# Patient Record
(demographics unavailable — no encounter records)

---

## 2025-01-09 NOTE — REVIEW OF SYSTEMS
[Constipation: Grade 0] : Constipation: Grade 0 [Diarrhea: Grade 0] : Diarrhea: Grade 0 [Proctitis: Grade 0] : Proctitis: Grade 0 [Rectal Pain: Grade 0] : Rectal Pain: Grade 0 [Hematuria: Grade 0] : Hematuria: Grade 0 [Urinary Incontinence: Grade 0] : Urinary Incontinence: Grade 0  [Urinary Retention: Grade 0] : Urinary Retention: Grade 0 [Urinary Tract Pain: Grade 0] : Urinary Tract Pain: Grade 0 [Urinary Urgency: Grade 0] : Urinary Urgency: Grade 0 [Urinary Frequency: Grade 1 - Present] : Urinary Frequency: Grade 1 - Present

## 2025-01-13 NOTE — DISEASE MANAGEMENT
[1] : T1 [c] : c [0] : M0 [10-20] : 10 - 20 ng/mL [Biopsy with Fusion] : Patient had a biopsy with fusion on [8] : Fusion Biopsy Geeta Score: 8 [5] : 5 [] : Patient had a Prostate MRI [IIC] : IIC [Radiation Therapy] : Radiation Therapy [EBRT] : EBRT [HDR] : HDR [Treatment with androgen ablation] : Treatment with androgen ablation [BiopsyDate] : 07/22 [MeasuredProstateVolume] : 32 [TotalCores] : 13 [TotalPositiveCores] : 6 [MaxCoreInvolvement] : 100 [RadiationCompletedDate] : 11/2022 [EBRTDose] : 4500cGy [EBRTFractions] : 25 [HDRFractions] : seed implant 11/9/22 [ADTStartedDate] : 08/22 [ADTDuration] : 2 years

## 2025-01-13 NOTE — DISEASE MANAGEMENT
[1] : T1 [c] : c [0] : M0 [10-20] : 10 - 20 ng/mL [Biopsy with Fusion] : Patient had a biopsy with fusion on [8] : Fusion Biopsy Geeta Score: 8 [] : Patient had a Prostate MRI [5] : 5 [IIC] : IIC [Radiation Therapy] : Radiation Therapy [EBRT] : EBRT [HDR] : HDR [Treatment with androgen ablation] : Treatment with androgen ablation [BiopsyDate] : 07/22 [MeasuredProstateVolume] : 32 [TotalCores] : 13 [TotalPositiveCores] : 6 [MaxCoreInvolvement] : 100 [RadiationCompletedDate] : 11/2022 [EBRTDose] : 4500cGy [EBRTFractions] : 25 [HDRFractions] : seed implant 11/9/22 [ADTStartedDate] : 08/22 [ADTDuration] : 2 years

## 2025-01-13 NOTE — PHYSICAL EXAM
[Sclera] : the sclera and conjunctiva were normal [] : no respiratory distress [Abdomen Soft] : soft [Nondistended] : nondistended [Normal] : oriented to person, place and time, the affect was normal, the mood was normal and not anxious

## 2025-01-13 NOTE — HISTORY OF PRESENT ILLNESS
[FreeTextEntry1] : Mr. 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 He completed external beam radiation therapy to the prostate, a dose of 4,500 cGy from 9/19/22 - 10/21/22, followed by brachytherapy boost with 100 Gy Palladium 103 seed implant on 11/9/22. He started Eligard 45 mg on 8/11/22 and completed two years. He presents for routine follow up.  The patient is feeling generally well.  He is involved in all his normal usual activities including half marathons, biking and yoga. He denies pain or discomfort. He continues tamsulosin 1 - 2x weekly PRN. He denies urinary and bowel issues.  He denies pain, no fatigue. He reported his weight as stable. He reports that his left breast is bigger but non-tender.  Dr. Rogel PCP due in April 2025 Cardiologist Dr. Fernando - around the same time as Dr. Whitfield colonscopy 10yrs, due in a few years, 2028

## 2025-01-13 NOTE — HISTORY OF PRESENT ILLNESS
[FreeTextEntry1] : Mr. 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 He completed external beam radiation therapy to the prostate, a dose of 4,500 cGy from 9/19/22 - 10/21/22, followed by brachytherapy boost with 100 Gy Palladium 103 seed implant on 11/9/22. He started Eligard 45 mg on 8/11/22 and completed two years. He presents for routine follow up.  The patient is feeling generally well.  He is involved in all his normal usual activities including half marathons, biking and yoga. He denies pain or discomfort. He continues tamsulosin 1 - 2x weekly PRN. He denies urinary and bowel issues.  He denies pain, no fatigue. He reported his weight as stable. He reports that his left breast is bigger but non-tender.  Dr. Rogel PCP due in April 2025 Cardiologist Dr. Fernando - around the same time as Dr. Whitfield colonscopy 10yrs, due in a few years, 2028

## 2025-03-14 NOTE — PHYSICAL EXAM
[General Appearance - Well Developed] : well developed [General Appearance - Well Nourished] : well nourished [] : no respiratory distress [Exaggerated Use Of Accessory Muscles For Inspiration] : no accessory muscle use [Abdomen Soft] : soft [Abdomen Tenderness] : non-tender [Oriented To Time, Place, And Person] : oriented to person, place, and time [Affect] : the affect was normal [Mood] : the mood was normal [Not Anxious] : not anxious [FreeTextEntry1] : Genital exam was done previously.

## 2025-03-14 NOTE — LETTER BODY
[Dear  ___] : Dear  [unfilled], [Consult Letter:] : I had the pleasure of evaluating your patient, [unfilled]. [Consult Closing:] : Thank you very much for allowing me to participate in the care of this patient.  If you have any questions, please do not hesitate to contact me. [FreeTextEntry1] : Address: [address] [place] [address]\[place]   Phone: [phone]

## 2025-03-14 NOTE — HISTORY OF PRESENT ILLNESS
[FreeTextEntry1] : He is a 70 year-old man who is seen today for prostate cancer.  Nocturia is minimal.  He uses tamsulosin twice a week.  Residual urine today was less than 100 mL.  PSA level was undetectable in January 2025.  He received the last androgen deprivation therapy injection in March 2024.  He started androgen deprivation therapy in November 2022.  He completed radiation therapy and seed implant in November 2022.  He is on calcium and vitamin D.    Previous notes: MRI of the prostate in May 2022 showed a very large right-sided PI-RADS 5 lesion.  Prostate measured 32 cc.  PSA level was 12.8 with 12% free PSA.  Transperineal fusion prostate biopsy in July 2022 showed multiple positive cores from the right side of the prostate which were Geeta 4+3 and also Vivian 8 prostate cancer up to 100% of the core.  There was perineural invasion.  He usually does not have significant urinary symptoms. He also has a large right inguinal hernia which he has been watching.  Bone scan showed no evidence of metastasis.